# Patient Record
Sex: MALE | Race: WHITE | NOT HISPANIC OR LATINO | URBAN - METROPOLITAN AREA
[De-identification: names, ages, dates, MRNs, and addresses within clinical notes are randomized per-mention and may not be internally consistent; named-entity substitution may affect disease eponyms.]

---
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
## 2025-05-05 ENCOUNTER — TELEPHONE (OUTPATIENT)
Age: 25
End: 2025-05-05

## 2025-05-05 NOTE — TELEPHONE ENCOUNTER
Patient has been added to the [facility] wait list without a referral.    Insurance: [employer] [place]  Insurance Type:    Commercial []   Medicaid [x]   County (if applicable)   Medicare []  Location Preference: n/a  Provider Preference: n/a  Virtual: Yes [] No [x]  Were outside resources sent: Yes [x] No []      Patient called with  from [facility] on the line to be added to [facility]    Pt has not email resource packet email to [email or web address]

## 2025-07-08 ENCOUNTER — OFFICE VISIT (OUTPATIENT)
Dept: FAMILY MEDICINE CLINIC | Facility: CLINIC | Age: 25
End: 2025-07-08
Payer: COMMERCIAL

## 2025-07-08 VITALS
BODY MASS INDEX: 28.64 KG/M2 | OXYGEN SATURATION: 99 % | SYSTOLIC BLOOD PRESSURE: 110 MMHG | TEMPERATURE: 98.7 F | WEIGHT: 189 LBS | HEIGHT: 68 IN | DIASTOLIC BLOOD PRESSURE: 80 MMHG | RESPIRATION RATE: 16 BRPM | HEART RATE: 66 BPM

## 2025-07-08 DIAGNOSIS — Z11.3 SCREENING FOR STDS (SEXUALLY TRANSMITTED DISEASES): ICD-10-CM

## 2025-07-08 DIAGNOSIS — Z11.59 NEED FOR HEPATITIS C SCREENING TEST: ICD-10-CM

## 2025-07-08 DIAGNOSIS — Z11.4 SCREENING FOR HIV (HUMAN IMMUNODEFICIENCY VIRUS): ICD-10-CM

## 2025-07-08 DIAGNOSIS — Z13.29 SCREENING FOR ENDOCRINE DISORDER: ICD-10-CM

## 2025-07-08 DIAGNOSIS — L70.0 ACNE VULGARIS: ICD-10-CM

## 2025-07-08 DIAGNOSIS — Z13.6 SCREENING FOR CARDIOVASCULAR CONDITION: ICD-10-CM

## 2025-07-08 DIAGNOSIS — Z76.89 ENCOUNTER TO ESTABLISH CARE: Primary | ICD-10-CM

## 2025-07-08 DIAGNOSIS — G89.29 CHRONIC PAIN OF BOTH ANKLES: ICD-10-CM

## 2025-07-08 DIAGNOSIS — R51.9 FREQUENT HEADACHES: ICD-10-CM

## 2025-07-08 DIAGNOSIS — M25.572 CHRONIC PAIN OF BOTH ANKLES: ICD-10-CM

## 2025-07-08 DIAGNOSIS — F84.0 AUTISM: ICD-10-CM

## 2025-07-08 DIAGNOSIS — M25.571 CHRONIC PAIN OF BOTH ANKLES: ICD-10-CM

## 2025-07-08 DIAGNOSIS — J45.20 MILD INTERMITTENT ASTHMA WITHOUT COMPLICATION: ICD-10-CM

## 2025-07-08 PROCEDURE — 99204 OFFICE O/P NEW MOD 45 MIN: CPT

## 2025-07-08 RX ORDER — ALBUTEROL SULFATE 90 UG/1
2 INHALANT RESPIRATORY (INHALATION) EVERY 6 HOURS PRN
Qty: 8 G | Refills: 3 | Status: SHIPPED | OUTPATIENT
Start: 2025-07-08

## 2025-07-08 RX ORDER — LORATADINE 10 MG/1
10 TABLET ORAL DAILY
Qty: 30 TABLET | Refills: 5 | Status: SHIPPED | OUTPATIENT
Start: 2025-07-08

## 2025-07-08 RX ORDER — FLUTICASONE PROPIONATE 50 MCG
1 SPRAY, SUSPENSION (ML) NASAL DAILY
Qty: 11.1 ML | Refills: 3 | Status: SHIPPED | OUTPATIENT
Start: 2025-07-08

## 2025-07-08 RX ORDER — CLONIDINE HYDROCHLORIDE 0.1 MG/1
TABLET ORAL
COMMUNITY
Start: 2025-06-20

## 2025-07-08 RX ORDER — HYDROXYZINE PAMOATE 50 MG/1
CAPSULE ORAL
COMMUNITY
Start: 2025-06-20

## 2025-07-08 NOTE — ASSESSMENT & PLAN NOTE
Comes to me on albuterol 90 mcg inhaler. Problem is stable. Albuterol refilled today.  Orders:  •  albuterol (Ventolin HFA) 90 mcg/act inhaler; Inhale 2 puffs every 6 (six) hours as needed for wheezing

## 2025-07-08 NOTE — PROGRESS NOTES
Name: Taiwo Bennett      : 2000      MRN: [id number]  Encounter Provider: LEYDI Ray  Encounter Date: 2025   Encounter department: [facility]  :  Assessment & Plan  Encounter to establish care  Minimal previous records to review. Pt recently released from incarceration. Pt's mother and  present for appointment.  will print form for transportation request.       Autism  Follows with Psychiatry and is medically managed by the same.  Continue clonidine 0.1 mg, sertraline 50 mg, and hydroxyzine 50 mg as prescribed by specialty provider.          Frequent headaches  HA began 2 weeks ago. By description appears to be sinus HA. Recommend use of antihistamine and flonase. Orders placed. Recommend supportive care measures with use of NSAIDs PRN, cold compresses, and increased fluids.   Orders:  •  loratadine (CLARITIN) 10 mg tablet; Take 1 tablet (10 mg total) by mouth daily  •  fluticasone (FLONASE) 50 mcg/act nasal spray; 1 spray into each nostril daily    Acne vulgaris    Orders:  •  Ambulatory Referral to Dermatology; Future    Mild intermittent asthma without complication  Comes to me on albuterol 90 mcg inhaler. Problem is stable. Albuterol refilled today.  Orders:  •  albuterol (Ventolin HFA) 90 mcg/act inhaler; Inhale 2 puffs every 6 (six) hours as needed for wheezing    Chronic pain of both ankles  Declines referral to PT today. Referral to Podiatry placed. Recommend supportive shoes, antiinflammatory medications, heat, ice.  Orders:  •  Ambulatory Referral to Podiatry; Future    Screening for STDs (sexually transmitted diseases)  Pt and mother requesting STD testing. Orders placed.  Orders:  •  Herpes I/II IgG Antibodies; Future  •  Chlamydia/Gonococcus/Genital Mycoplasma Profile, AMANDA, Urine; Future  •  RPR w/reflex to TrepSure; Future    Screening for cardiovascular condition    Orders:  •  CBC and differential; Future  •  Comprehensive  metabolic panel; Future  •  Lipid Panel with Direct LDL reflex; Future  •  TSH, 3rd generation with Free T4 reflex; Future    Screening for endocrine disorder    Orders:  •  Comprehensive metabolic panel; Future  •  Hemoglobin A1C; Future    Need for hepatitis C screening test    Orders:  •  Hepatitis C Antibody; Future    Screening for HIV (human immunodeficiency virus)    Orders:  •  HIV 1/2 AG/AB W REFLEX [facility] and [facility] only; Future      Follow up in 2 months for CP or sooner if needed.     History of Present Illness   Taiwo Bennett is a 25 y.o. year old male who presents today to [facility] care. Previously incarcerated for 6 years. He is accompanied by his mom and a . His concerns include bad headaches and ankle pain. Headaches are keeping him up at night and have been going on for 2 weeks. Tylenol is not helping. HA lasts all day. HA is centered in the middle of his forehead.   Ankle pain - sprained it 3 year ago. Now when he does physical work he has ankle pain and needs to take a break. Both ankles hurt. ACE bandage helped a little bit. Some ankle swelling.     Emerald Goodman .      Ankle Pain     Headache    Review of Systems   Constitutional: Negative.  Negative for chills, fatigue and fever.   HENT: Negative.  Negative for congestion, ear pain, rhinorrhea and sore throat.    Eyes: Negative.  Negative for pain and visual disturbance.   Respiratory: Negative.  Negative for cough and shortness of breath.    Cardiovascular: Negative.  Negative for chest pain, palpitations and leg swelling.   Gastrointestinal:  Negative for abdominal pain, constipation, diarrhea, nausea and vomiting.   Endocrine: Negative.    Genitourinary: Negative.  Negative for dysuria, frequency and urgency.   Musculoskeletal:  Positive for arthralgias. Negative for back pain, gait problem, joint swelling, myalgias, neck pain and neck stiffness.   Skin: Negative.  Negative for rash.   Allergic/Immunologic: Negative.   "  Neurological:  Positive for headaches. Negative for dizziness, weakness and light-headedness.   Hematological: Negative.    Psychiatric/Behavioral: Negative.         Objective   /80 (BP Location: Left arm, Patient Position: Sitting, Cuff Size: Standard)   Pulse 66   Temp 98.7 °F (37.1 °C) (Tympanic)   Resp 16   Ht 5' 8\" (1.727 m)   Wt 85.7 kg (189 lb)   SpO2 99%   BMI 28.74 kg/m²      Physical Exam  Vitals and nursing note reviewed. Exam conducted with a chaperone present (mom and ).   Constitutional:       General: He is not in acute distress.     Appearance: Normal appearance. He is not ill-appearing.   HENT:      Head: Normocephalic and atraumatic.      Right Ear: Tympanic membrane, ear canal and external ear normal.      Left Ear: Tympanic membrane, ear canal and external ear normal.      Nose: Nose normal. No congestion.      Mouth/Throat:      Mouth: Mucous membranes are moist.      Pharynx: Oropharynx is clear. No posterior oropharyngeal erythema.     Eyes:      Extraocular Movements: Extraocular movements intact.      Conjunctiva/sclera: Conjunctivae normal.      Pupils: Pupils are equal, round, and reactive to light.       Cardiovascular:      Rate and Rhythm: Normal rate and regular rhythm.      Pulses: Normal pulses.      Heart sounds: Normal heart sounds. No murmur heard.  Pulmonary:      Effort: Pulmonary effort is normal. No respiratory distress.      Breath sounds: Normal breath sounds. No wheezing.   Abdominal:      General: Abdomen is flat. Bowel sounds are normal.      Palpations: Abdomen is soft.      Tenderness: There is no abdominal tenderness.     Musculoskeletal:         General: Normal range of motion.      Cervical back: Normal range of motion and neck supple. No tenderness.      Right ankle: No swelling, deformity, ecchymosis or lacerations. Tenderness present. Normal range of motion. Normal pulse.      Left ankle: No swelling, deformity, ecchymosis or lacerations. " Tenderness present. Normal range of motion. Normal pulse.        Legs:    Lymphadenopathy:      Cervical: No cervical adenopathy.     Skin:     General: Skin is warm and dry.      Capillary Refill: Capillary refill takes less than 2 seconds.      Findings: No bruising or rash.     Neurological:      General: No focal deficit present.      Mental Status: He is alert and oriented to person, place, and time.     Psychiatric:         Mood and Affect: Mood normal.         Behavior: Behavior normal.

## 2025-07-08 NOTE — ASSESSMENT & PLAN NOTE
Follows with Psychiatry and is medically managed by the same.  Continue clonidine 0.1 mg, sertraline 50 mg, and hydroxyzine 50 mg as prescribed by specialty provider.

## 2025-07-08 NOTE — PATIENT INSTRUCTIONS
Start claritin and flonase for the headaches. Start taking ibuprofen 600 mg every 6-8 hours.     Peppermint oil and cold compresses to help with headaches.

## 2025-07-10 ENCOUNTER — TELEPHONE (OUTPATIENT)
Age: 25
End: 2025-07-10

## 2025-07-12 LAB
ALBUMIN SERPL-MCNC: 5 G/DL (ref 4.3–5.2)
ALP SERPL-CCNC: 85 IU/L (ref 44–121)
ALT SERPL-CCNC: 28 IU/L (ref 0–44)
AST SERPL-CCNC: 22 IU/L (ref 0–40)
BASOPHILS # BLD AUTO: 0 X10E3/UL (ref 0–0.2)
BASOPHILS NFR BLD AUTO: 1 %
BILIRUB SERPL-MCNC: 0.7 MG/DL (ref 0–1.2)
BUN SERPL-MCNC: 14 MG/DL (ref 6–20)
BUN/CREAT SERPL: 14 (ref 9–20)
C TRACH RRNA UR QL NAA+PROBE: NEGATIVE
CALCIUM SERPL-MCNC: 9.7 MG/DL (ref 8.7–10.2)
CHLORIDE SERPL-SCNC: 101 MMOL/L (ref 96–106)
CHOLEST SERPL-MCNC: 306 MG/DL (ref 100–199)
CO2 SERPL-SCNC: 21 MMOL/L (ref 20–29)
CREAT SERPL-MCNC: 0.99 MG/DL (ref 0.76–1.27)
EGFR: 108 ML/MIN/1.73
EOSINOPHIL # BLD AUTO: 0.1 X10E3/UL (ref 0–0.4)
EOSINOPHIL NFR BLD AUTO: 2 %
ERYTHROCYTE [DISTWIDTH] IN BLOOD BY AUTOMATED COUNT: 13.1 % (ref 11.6–15.4)
EST. AVERAGE GLUCOSE BLD GHB EST-MCNC: 105 MG/DL
GLOBULIN SER-MCNC: 2.6 G/DL (ref 1.5–4.5)
GLUCOSE SERPL-MCNC: 102 MG/DL (ref 70–99)
HBA1C MFR BLD: 5.3 % (ref 4.8–5.6)
HCT VFR BLD AUTO: 41.8 % (ref 37.5–51)
HCV AB S/CO SERPL IA: NON REACTIVE
HDLC SERPL-MCNC: 65 MG/DL
HGB BLD-MCNC: 13.6 G/DL (ref 13–17.7)
HIV 1+2 AB+HIV1 P24 AG SERPL QL IA: NON REACTIVE
HSV1 IGG SER IA-ACNC: NON REACTIVE
HSV2 IGG SER IA-ACNC: NON REACTIVE
IMM GRANULOCYTES # BLD: 0 X10E3/UL (ref 0–0.1)
IMM GRANULOCYTES NFR BLD: 0 %
LDL CALC COMMENT: ABNORMAL
LDLC SERPL CALC-MCNC: 226 MG/DL (ref 0–99)
LDLC/HDLC SERPL: 3.5 RATIO (ref 0–3.6)
LYMPHOCYTES # BLD AUTO: 2.2 X10E3/UL (ref 0.7–3.1)
LYMPHOCYTES NFR BLD AUTO: 49 %
M GENITALIUM DNA SPEC QL NAA+PROBE: NEGATIVE
M HOMINIS DNA SPEC QL NAA+PROBE: NEGATIVE
MCH RBC QN AUTO: 29.6 PG (ref 26.6–33)
MCHC RBC AUTO-ENTMCNC: 32.5 G/DL (ref 31.5–35.7)
MCV RBC AUTO: 91 FL (ref 79–97)
MONOCYTES # BLD AUTO: 0.3 X10E3/UL (ref 0.1–0.9)
MONOCYTES NFR BLD AUTO: 7 %
N GONORRHOEA RRNA UR QL NAA+PROBE: NEGATIVE
NEUTROPHILS # BLD AUTO: 1.8 X10E3/UL (ref 1.4–7)
NEUTROPHILS NFR BLD AUTO: 41 %
PLATELET # BLD AUTO: 179 X10E3/UL (ref 150–450)
POTASSIUM SERPL-SCNC: 4.2 MMOL/L (ref 3.5–5.2)
PROT SERPL-MCNC: 7.6 G/DL (ref 6–8.5)
RBC # BLD AUTO: 4.6 X10E6/UL (ref 4.14–5.8)
SL AMB RPR: NON REACTIVE
SL AMB T4, FREE (DIRECT): 1.33 NG/DL (ref 0.82–1.77)
SL AMB VLDL CHOLESTEROL CALC: 15 MG/DL (ref 5–40)
SODIUM SERPL-SCNC: 139 MMOL/L (ref 134–144)
TREPONEMA PALLIDUM ANTIBODIES: NON REACTIVE
TRIGL SERPL-MCNC: 92 MG/DL (ref 0–149)
TSH SERPL DL<=0.005 MIU/L-ACNC: 4.64 UIU/ML (ref 0.45–4.5)
UREAPLASMA DNA SPEC QL NAA+PROBE: NEGATIVE
WBC # BLD AUTO: 4.5 X10E3/UL (ref 3.4–10.8)

## 2025-07-14 ENCOUNTER — RESULTS FOLLOW-UP (OUTPATIENT)
Dept: FAMILY MEDICINE CLINIC | Facility: CLINIC | Age: 25
End: 2025-07-14

## 2025-07-14 DIAGNOSIS — R79.89 ELEVATED TSH: ICD-10-CM

## 2025-07-14 DIAGNOSIS — E78.2 MIXED HYPERLIPIDEMIA: Primary | ICD-10-CM

## 2025-07-14 NOTE — TELEPHONE ENCOUNTER
Called to discuss recent lab work. All STD testing was normal. Pt's cholesterol was very elevated. His cholesterol was 306 and his low density lipids were 226. Recommend additional lab work for lipoprotein A and beginning therapy with a statin medication. Also recommend healthy dietary choices through lean meat, whole grains, fruits, veggies, and healthy fats. Pt's thyroid hormone was just a little elevated. No medications recommended at this time. Will call again at another time. Labs placed today through [facility]. All other lab results were WNL.

## 2025-07-15 NOTE — TELEPHONE ENCOUNTER
7/16/25 addendum: Spoke with pt's mother, Merlin. Reviewed recent lab results from 7/9/25 and recommendations for statin therapy d/t marked LDL elevation. She is agreeable to having her son start medication therapy. Rosuvastatin 20 mg ordered today. Recommend healthy dietary modifications including avoidance of fatty, fried foods, red meat, alcohol, and saturated fats. Medication dosing and SE reviewed. Repeat labs ordered for 3 months with addition of lipoprotein A and repeat TSH for slight elevation.       Attempted to call pt's mother back as requested. If she calls back, can we find out what questions she has? Left VM requesting good time to talk. Otherwise will call try back tomorrow morning prior to office hours at 8 or between 12-1pm.    ----- Message from Corinne N sent at 7/15/2025  2:14 PM EDT -----    ----- Message -----  From: Jessica Calvert  Sent: 7/15/2025  11:51 AM EDT  To: [facility]    ----- Message from Jessica Calvert sent at 7/15/2025 11:51 AM EDT -----

## 2025-07-16 RX ORDER — ROSUVASTATIN CALCIUM 20 MG/1
20 TABLET, COATED ORAL DAILY
Qty: 90 TABLET | Refills: 1 | Status: SHIPPED | OUTPATIENT
Start: 2025-07-16

## 2025-08-08 ENCOUNTER — OFFICE VISIT (OUTPATIENT)
Dept: PODIATRY | Facility: CLINIC | Age: 25
End: 2025-08-08
Payer: COMMERCIAL

## 2025-08-08 VITALS — BODY MASS INDEX: 28.74 KG/M2 | HEIGHT: 68 IN

## 2025-08-08 DIAGNOSIS — G89.29 CHRONIC PAIN OF BOTH ANKLES: Primary | ICD-10-CM

## 2025-08-08 DIAGNOSIS — M76.61 ACHILLES TENDINITIS OF BOTH LOWER EXTREMITIES: ICD-10-CM

## 2025-08-08 DIAGNOSIS — M25.572 CHRONIC PAIN OF BOTH ANKLES: Primary | ICD-10-CM

## 2025-08-08 DIAGNOSIS — M25.571 CHRONIC PAIN OF BOTH ANKLES: Primary | ICD-10-CM

## 2025-08-08 DIAGNOSIS — M76.62 ACHILLES TENDINITIS OF BOTH LOWER EXTREMITIES: ICD-10-CM

## 2025-08-08 PROCEDURE — 99203 OFFICE O/P NEW LOW 30 MIN: CPT | Performed by: PODIATRIST

## 2025-08-12 ENCOUNTER — TELEPHONE (OUTPATIENT)
Dept: PODIATRY | Facility: CLINIC | Age: 25
End: 2025-08-12

## 2025-08-12 ENCOUNTER — TELEPHONE (OUTPATIENT)
Age: 25
End: 2025-08-12

## 2025-08-12 PROBLEM — M25.371 INSTABILITY OF JOINTS OF BOTH ANKLES: Status: ACTIVE | Noted: 2025-08-12

## 2025-08-12 PROBLEM — M25.372 INSTABILITY OF JOINTS OF BOTH ANKLES: Status: ACTIVE | Noted: 2025-08-12
